# Patient Record
Sex: MALE | Race: ASIAN
[De-identification: names, ages, dates, MRNs, and addresses within clinical notes are randomized per-mention and may not be internally consistent; named-entity substitution may affect disease eponyms.]

---

## 2017-12-04 ENCOUNTER — HOSPITAL ENCOUNTER (OUTPATIENT)
Dept: HOSPITAL 53 - M ED | Age: 27
LOS: 2 days | Discharge: HOME | End: 2017-12-06
Attending: SURGERY
Payer: COMMERCIAL

## 2017-12-04 VITALS — DIASTOLIC BLOOD PRESSURE: 89 MMHG | SYSTOLIC BLOOD PRESSURE: 136 MMHG

## 2017-12-04 VITALS — HEIGHT: 70 IN | BODY MASS INDEX: 33.71 KG/M2 | WEIGHT: 235.5 LBS

## 2017-12-04 VITALS — DIASTOLIC BLOOD PRESSURE: 87 MMHG | SYSTOLIC BLOOD PRESSURE: 131 MMHG

## 2017-12-04 VITALS — DIASTOLIC BLOOD PRESSURE: 89 MMHG | SYSTOLIC BLOOD PRESSURE: 137 MMHG

## 2017-12-04 VITALS — DIASTOLIC BLOOD PRESSURE: 78 MMHG | SYSTOLIC BLOOD PRESSURE: 134 MMHG

## 2017-12-04 DIAGNOSIS — K35.80: Primary | ICD-10-CM

## 2017-12-04 DIAGNOSIS — I10: ICD-10-CM

## 2017-12-04 DIAGNOSIS — R50.9: ICD-10-CM

## 2017-12-04 DIAGNOSIS — R00.0: ICD-10-CM

## 2017-12-04 DIAGNOSIS — R33.9: ICD-10-CM

## 2017-12-04 LAB
ALBUMIN SERPL BCG-MCNC: 4 GM/DL (ref 3.2–5.2)
ALBUMIN/GLOB SERPL: 0.89 {RATIO} (ref 1–1.93)
ALP SERPL-CCNC: 65 U/L (ref 45–117)
ALT SERPL W P-5'-P-CCNC: 51 U/L (ref 12–78)
AMYLASE SERPL-CCNC: 58 U/L (ref 25–115)
ANION GAP SERPL CALC-SCNC: 5 MEQ/L (ref 8–16)
AST SERPL-CCNC: 17 U/L (ref 7–37)
BASOPHILS # BLD AUTO: 0 10^3/UL (ref 0–0.2)
BASOPHILS NFR BLD AUTO: 0.2 % (ref 0–1)
BILIRUB CONJ SERPL-MCNC: 0.1 MG/DL (ref 0–0.2)
BILIRUB SERPL-MCNC: 0.5 MG/DL (ref 0.2–1)
BUN SERPL-MCNC: 9 MG/DL (ref 7–18)
CALCIUM SERPL-MCNC: 9.5 MG/DL (ref 8.5–10.1)
CHLORIDE SERPL-SCNC: 102 MEQ/L (ref 98–107)
CO2 SERPL-SCNC: 31 MEQ/L (ref 21–32)
CREAT SERPL-MCNC: 0.83 MG/DL (ref 0.7–1.3)
EOSINOPHIL # BLD AUTO: 0.1 10^3/UL (ref 0–0.5)
EOSINOPHIL NFR BLD AUTO: 0.6 % (ref 0–3)
ERYTHROCYTE [DISTWIDTH] IN BLOOD BY AUTOMATED COUNT: 13.7 % (ref 11.5–14.5)
GFR SERPL CREATININE-BSD FRML MDRD: > 60 ML/MIN/{1.73_M2} (ref 60–?)
GLUCOSE SERPL-MCNC: 93 MG/DL (ref 70–105)
IMM GRANULOCYTES NFR BLD: 0.3 % (ref 0–0)
LYMPHOCYTES # BLD AUTO: 2.5 10^3/UL (ref 1.5–6.5)
LYMPHOCYTES NFR BLD AUTO: 25.7 % (ref 24–44)
MCH RBC QN AUTO: 28 PG (ref 27–33)
MCHC RBC AUTO-ENTMCNC: 33 G/DL (ref 32–36.5)
MCV RBC AUTO: 85 FL (ref 80–96)
MONOCYTES # BLD AUTO: 0.7 10^3/UL (ref 0–0.8)
MONOCYTES NFR BLD AUTO: 7.3 % (ref 0–5)
NEUTROPHILS # BLD AUTO: 6.3 10^3/UL (ref 1.8–7.7)
NEUTROPHILS NFR BLD AUTO: 65.9 % (ref 36–66)
NRBC BLD AUTO-RTO: 0 % (ref 0–0)
PLATELET # BLD AUTO: 241 10^3/UL (ref 150–450)
POTASSIUM SERPL-SCNC: 4.1 MEQ/L (ref 3.5–5.1)
PROT SERPL-MCNC: 8.5 GM/DL (ref 6.4–8.2)
SODIUM SERPL-SCNC: 138 MEQ/L (ref 136–145)
WBC # BLD AUTO: 9.6 10^3/UL (ref 4–10)

## 2017-12-04 PROCEDURE — 80076 HEPATIC FUNCTION PANEL: CPT

## 2017-12-04 PROCEDURE — 96374 THER/PROPH/DIAG INJ IV PUSH: CPT

## 2017-12-04 PROCEDURE — 85025 COMPLETE CBC W/AUTO DIFF WBC: CPT

## 2017-12-04 PROCEDURE — 85027 COMPLETE CBC AUTOMATED: CPT

## 2017-12-04 PROCEDURE — 85014 HEMATOCRIT: CPT

## 2017-12-04 PROCEDURE — 85018 HEMOGLOBIN: CPT

## 2017-12-04 PROCEDURE — 80048 BASIC METABOLIC PNL TOTAL CA: CPT

## 2017-12-04 PROCEDURE — 82150 ASSAY OF AMYLASE: CPT

## 2017-12-04 PROCEDURE — 81001 URINALYSIS AUTO W/SCOPE: CPT

## 2017-12-04 PROCEDURE — 36415 COLL VENOUS BLD VENIPUNCTURE: CPT

## 2017-12-04 PROCEDURE — 99284 EMERGENCY DEPT VISIT MOD MDM: CPT

## 2017-12-04 PROCEDURE — 88304 TISSUE EXAM BY PATHOLOGIST: CPT

## 2017-12-04 PROCEDURE — 74177 CT ABD & PELVIS W/CONTRAST: CPT

## 2017-12-04 PROCEDURE — 96376 TX/PRO/DX INJ SAME DRUG ADON: CPT

## 2017-12-04 PROCEDURE — 96375 TX/PRO/DX INJ NEW DRUG ADDON: CPT

## 2017-12-04 PROCEDURE — 44970 LAPAROSCOPY APPENDECTOMY: CPT

## 2017-12-04 PROCEDURE — 83690 ASSAY OF LIPASE: CPT

## 2017-12-04 RX ADMIN — SODIUM CHLORIDE, POTASSIUM CHLORIDE, SODIUM LACTATE AND CALCIUM CHLORIDE SCH MLS/HR: 600; 310; 30; 20 INJECTION, SOLUTION INTRAVENOUS at 20:17

## 2017-12-04 RX ADMIN — DOCUSATE SODIUM,SENNOSIDES SCH TAB: 50; 8.6 TABLET, FILM COATED ORAL at 21:00

## 2017-12-04 NOTE — REP
CT ABDOMEN PELVIS WITH IV CONTRAST ONLY:  12/04/2017.

 

Clinical history:  Right lower quadrant and left lower quadrant abdominal pain,

was initially periumbilical.

 

Technique.  The patient received a bolus of 100 mL of  Isovue 370 scanning

through the abdomen pelvis with coronal and sagittal reconstructions provided.

Bone windows also reviewed.

 

Findings: CT abdomen: Lung bases are clear.  Heart is not enlarged.  There is no

pericardial thickening or effusion and no aortic aneurysm.  No

hepatosplenomegaly, focal hepatic or splenic mass nor intrahepatic biliary

dilatation.  Gallbladder without calcified stone or mass.  Pancreas unremarkable.

Adrenal glands normal.  Kidneys without stone, mass or cyst.  Extrarenal pelves

are noted bilaterally, left greater than right.  Ureters are followed to the

bladder with no renal or ureteral stone identified.  The aorta is intact.  No

periaortic or retroperitoneal pathologic sized lymphadenopathy.  Lung window

review of all CT slices in the abdomen shows no perforation or free air.  Small

bowel loops unremarkable.  The right abdomen just at about at the level at the

iliac crest, the appendix extending to the posterior cecum with extensive

periappendiceal inflammatory changes.  Stranding in the fat and along the lateral

coronal fascia on the right side.  The adjacent terminal ileum shows some

inflammatory changes in the side abutting the appendix.  Lung window review shows

no evidence of perforation, free air or abscess at this time.  There are a few

tiny nodes in the pericecal region.  Remainder of the abdominal portion of the

colon was unremarkable.  Bone windows show lumbar and lower thoracic spine

without compression deformity.  There is unilateral L5 spondylolysis on the left

without spondylolisthesis.  Visualized thoracic levels and the ribs were

unremarkable.

 

CT pelvis: The sacrum, SI joints, pelvis, hips, acetabuli, pubic rami and

symphysis pubis were all unremarkable.  There are some bone islands in the hips

bilaterally as benign findings.  Bladder unremarkable.  No dilated distal ureters

or stones.  No bladder calculi mass or wall thickening.  No ventral or inguinal

hernia nor pathologic sized inguinal adenopathy.  No pelvic adenopathy or free

fluid.  Distal left colon, sigmoid and rectum intact.

 

Impression:

 

1.  Evidence for dilated appendix with extensive inflammatory changes adjacent

but no perforation or abscess at this time.  No air bubbles adjacent to it.  No

appendicolith seen.  The terminal ileum adjacent to the appendix shows

inflammatory changes but above and below that site.  There is no inflammatory

change.  Findings are consistent with acute appendicitis without perforation or

abscess.

 

2.  I do not see any other significant CT findings abdomen or pelvis.

 

 

Signed by

Low Mckeon MD 12/05/2017 08:15 P

## 2017-12-04 NOTE — HPE
DATE OF ADMISSION:  12/04/2017

 

CHIEF COMPLAINT:  Right lower quadrant abdominal pain.

 

HISTORY OF PRESENT ILLNESS: Patient is a 27-year-old male who presents right

lower quadrant abdominal pain since last Thursday.  It got better and worse off

and over the past 4 days.  Today, the pain was starting to get worse again so he

came into emergency room for evaluation.  In the ER he had normal vitals normal

labs however, CT scan showed signs of a dilated thickened appendix with concern

for acute appendicitis.  He denies any fevers or chills.  No nausea or vomiting.

No changes in bowel or bladder movements.  No recent illnesses or travel in the

last month.  No recent trauma to the area.

 

PAST MEDICAL HISTORY:  Negative.

 

PAST SURGICAL HISTORY: Right mastoidectomy.

 

ALLERGIES: None.

 

HOME MEDICATIONS: None.

 

SOCIAL HISTORY: Denies drug, alcohol, or tobacco.

 

FAMILY HISTORY: Noncontributory.

 

REVIEW OF SYSTEMS: Pertinent positives as stated in the HPI.

 

PHYSICAL EXAMINATION: GENERAL: In  no acute distress.  Vitals:  Stable, afebrile.

HEENT:  Pupils equal round react to light accommodation.

HEART:  S1-S2 regular rate and rhythm.

LUNGS:  Clear auscultation bilaterally.

ABDOMEN:  Soft, tender to palpation right lower quadrant.  No rebounding or

guarding. Bowel sounds positive.

EXTREMITIES: No clubbing, cyanosis or edema.

 

LABS: White count 9.6, hemoglobin 15, platelets 241.

 

IMAGING: CT abdomen and pelvis shows evidence of dilated appendix with extensive

inflammatory changes adjacent to it, but no signs of perforation or abscess.  No

air bubbles.

 

ASSESSMENT/PLAN: The patient is 27-year-old male with signs and symptoms

consistent of acute appendicitis.

 

RECOMMENDATIONS: Proceed with laparoscopic possible open appendectomy.  Risks and

benefits of the procedure not limited but including bleeding, infection, hernia

formation, damage surrounding structure, need for further surgery discussed in

detail with the patient. Informed consent was obtained and the procedure was

planned.

Postoperatively he will be admitted to the floor overnight.  As long as he is

doing well in the morning he will discharge home.

## 2017-12-05 VITALS — DIASTOLIC BLOOD PRESSURE: 70 MMHG | SYSTOLIC BLOOD PRESSURE: 139 MMHG

## 2017-12-05 VITALS — SYSTOLIC BLOOD PRESSURE: 146 MMHG | DIASTOLIC BLOOD PRESSURE: 76 MMHG

## 2017-12-05 VITALS — DIASTOLIC BLOOD PRESSURE: 82 MMHG | SYSTOLIC BLOOD PRESSURE: 140 MMHG

## 2017-12-05 VITALS — SYSTOLIC BLOOD PRESSURE: 122 MMHG | DIASTOLIC BLOOD PRESSURE: 71 MMHG

## 2017-12-05 VITALS — SYSTOLIC BLOOD PRESSURE: 145 MMHG | DIASTOLIC BLOOD PRESSURE: 88 MMHG

## 2017-12-05 VITALS — DIASTOLIC BLOOD PRESSURE: 88 MMHG | SYSTOLIC BLOOD PRESSURE: 144 MMHG

## 2017-12-05 VITALS — DIASTOLIC BLOOD PRESSURE: 73 MMHG | SYSTOLIC BLOOD PRESSURE: 134 MMHG

## 2017-12-05 LAB
ANION GAP SERPL CALC-SCNC: 8 MEQ/L (ref 8–16)
BUN SERPL-MCNC: 7 MG/DL (ref 7–18)
CALCIUM SERPL-MCNC: 8.3 MG/DL (ref 8.5–10.1)
CHLORIDE SERPL-SCNC: 102 MEQ/L (ref 98–107)
CO2 SERPL-SCNC: 29 MEQ/L (ref 21–32)
CREAT SERPL-MCNC: 0.92 MG/DL (ref 0.7–1.3)
ERYTHROCYTE [DISTWIDTH] IN BLOOD BY AUTOMATED COUNT: 13.9 % (ref 11.5–14.5)
GFR SERPL CREATININE-BSD FRML MDRD: > 60 ML/MIN/{1.73_M2} (ref 60–?)
GLUCOSE SERPL-MCNC: 131 MG/DL (ref 70–105)
MCH RBC QN AUTO: 29.1 PG (ref 27–33)
MCHC RBC AUTO-ENTMCNC: 34.3 G/DL (ref 32–36.5)
MCV RBC AUTO: 84.9 FL (ref 80–96)
NRBC BLD AUTO-RTO: 0 % (ref 0–0)
PLATELET # BLD AUTO: 220 10^3/UL (ref 150–450)
POTASSIUM SERPL-SCNC: 3.8 MEQ/L (ref 3.5–5.1)
SODIUM SERPL-SCNC: 139 MEQ/L (ref 136–145)
WBC # BLD AUTO: 10.7 10^3/UL (ref 4–10)

## 2017-12-05 RX ADMIN — KETOROLAC TROMETHAMINE PRN MG: 30 INJECTION, SOLUTION INTRAMUSCULAR at 09:27

## 2017-12-05 RX ADMIN — HYDROCODONE BITARTRATE AND ACETAMINOPHEN PRN TAB: 5; 325 TABLET ORAL at 17:47

## 2017-12-05 RX ADMIN — DOCUSATE SODIUM,SENNOSIDES SCH TAB: 50; 8.6 TABLET, FILM COATED ORAL at 21:50

## 2017-12-05 RX ADMIN — PIPERACILLIN SODIUM AND TAZOBACTAM SODIUM SCH MLS/HR: 36; 4.5 INJECTION, POWDER, FOR SOLUTION INTRAVENOUS at 12:59

## 2017-12-05 RX ADMIN — PIPERACILLIN SODIUM AND TAZOBACTAM SODIUM SCH MLS/HR: 36; 4.5 INJECTION, POWDER, FOR SOLUTION INTRAVENOUS at 23:34

## 2017-12-05 RX ADMIN — PIPERACILLIN SODIUM AND TAZOBACTAM SODIUM SCH MLS/HR: 36; 4.5 INJECTION, POWDER, FOR SOLUTION INTRAVENOUS at 17:47

## 2017-12-05 RX ADMIN — PIPERACILLIN SODIUM AND TAZOBACTAM SODIUM SCH MLS/HR: 36; 4.5 INJECTION, POWDER, FOR SOLUTION INTRAVENOUS at 06:01

## 2017-12-05 RX ADMIN — HYDROCODONE BITARTRATE AND ACETAMINOPHEN PRN TAB: 5; 325 TABLET ORAL at 08:18

## 2017-12-05 RX ADMIN — HYDROCODONE BITARTRATE AND ACETAMINOPHEN PRN TAB: 5; 325 TABLET ORAL at 23:35

## 2017-12-05 RX ADMIN — PIPERACILLIN SODIUM AND TAZOBACTAM SODIUM SCH MLS/HR: 36; 4.5 INJECTION, POWDER, FOR SOLUTION INTRAVENOUS at 00:09

## 2017-12-05 RX ADMIN — HYDROCODONE BITARTRATE AND ACETAMINOPHEN PRN TAB: 5; 325 TABLET ORAL at 00:09

## 2017-12-05 RX ADMIN — DOCUSATE SODIUM,SENNOSIDES SCH TAB: 50; 8.6 TABLET, FILM COATED ORAL at 08:17

## 2017-12-05 RX ADMIN — SODIUM CHLORIDE, POTASSIUM CHLORIDE, SODIUM LACTATE AND CALCIUM CHLORIDE SCH MLS/HR: 600; 310; 30; 20 INJECTION, SOLUTION INTRAVENOUS at 01:29

## 2017-12-05 NOTE — RO
DATE OF PROCEDURE:  12/04/2017

 

PREOPERATIVE DIAGNOSIS:  Acute appendicitis.

 

POSTOPERATIVE DIAGNOSIS:  Acute appendicitis.

 

PROCEDURE:  Laparoscopic appendectomy.

 

SURGEON:  Dr. Hugh Gallo

 

ASSISTANT:  None.

 

ANESTHESIA:  General.

 

ESTIMATED BLOOD LOSS:  5.

 

COMPLICATIONS:  None.

 

INDICATIONS FOR PROCEDURE:  The patient is 27-year-old male who presents with

right lower quadrant abdominal pain and found have signs of significant

inflammation and swelling around the appendix on CAT scan.  Recommendation was to

proceed with laparoscopic possible open appendectomy.  Risks and benefits of

procedure not limited to, but including bleeding, infection, hernia formation,

damage to surrounding structures, need for further surgery were discussed in

detail with the patient, informed consent obtained and procedure was planned.

 

DESCRIPTION OF PROCEDURE:  The patient was brought back to operating room three

after sufficient sedation.  The abdomen was sterilely prepped and draped.  Next a

time-out was done to confirm proper patient and proper procedure.  Following

that, a 5 mm incision was made in the left lower quadrant, Veress needle was

inserted and the abdomen was insufflated to 15 mmHg.  Next, the Veress needle was

removed.  A 5 mm Optiview port was used to gain access to the abdomen.  Once the

abdomen was entered, an 8 mm port was placed supraumbilically and another 5 mm

port suprapubically in the midline.  The appendix was then identified in the

right lower quadrant laterally.  It was densely adhered to the terminal ileum

with a large swelling and phlegmon distally.  This was carefully mobilized away

from the terminal ileum.  Towards the base the appendix was all fairly normal

size.  The mesoappendix was continually taken down using the Enseal until the

base was reached and space was reached.  It was encircled twice with two PDS

Endoloops and then amputated using the Enseal.  The appendix was brought out with

a 5 mm EndoCatch bag through the 8 mm port site.  The abdomen was then examined

one last time to confirm hemostasis.  The abdomen was then desufflated.  The skin

incisions closed #4-0 Vicryl subcuticular sutures.  The abdomen was cleaned and

dried.  Steri-Strips, 4x4 and tape were applied, thus ending the procedure.

## 2017-12-06 VITALS — SYSTOLIC BLOOD PRESSURE: 141 MMHG | DIASTOLIC BLOOD PRESSURE: 84 MMHG

## 2017-12-06 VITALS — SYSTOLIC BLOOD PRESSURE: 133 MMHG | DIASTOLIC BLOOD PRESSURE: 83 MMHG

## 2017-12-06 VITALS — DIASTOLIC BLOOD PRESSURE: 90 MMHG | SYSTOLIC BLOOD PRESSURE: 144 MMHG

## 2017-12-06 LAB
ANION GAP SERPL CALC-SCNC: 7 MEQ/L (ref 8–16)
BUN SERPL-MCNC: 7 MG/DL (ref 7–18)
CALCIUM SERPL-MCNC: 8.8 MG/DL (ref 8.5–10.1)
CHLORIDE SERPL-SCNC: 102 MEQ/L (ref 98–107)
CO2 SERPL-SCNC: 30 MEQ/L (ref 21–32)
CREAT SERPL-MCNC: 0.86 MG/DL (ref 0.7–1.3)
ERYTHROCYTE [DISTWIDTH] IN BLOOD BY AUTOMATED COUNT: 13.9 % (ref 11.5–14.5)
GFR SERPL CREATININE-BSD FRML MDRD: > 60 ML/MIN/{1.73_M2} (ref 60–?)
GLUCOSE SERPL-MCNC: 94 MG/DL (ref 70–105)
MCH RBC QN AUTO: 28.4 PG (ref 27–33)
MCHC RBC AUTO-ENTMCNC: 33.1 G/DL (ref 32–36.5)
MCV RBC AUTO: 85.8 FL (ref 80–96)
NRBC BLD AUTO-RTO: 0 % (ref 0–0)
PLATELET # BLD AUTO: 212 10^3/UL (ref 150–450)
POTASSIUM SERPL-SCNC: 3.6 MEQ/L (ref 3.5–5.1)
SODIUM SERPL-SCNC: 139 MEQ/L (ref 136–145)
WBC # BLD AUTO: 9.6 10^3/UL (ref 4–10)

## 2017-12-06 RX ADMIN — KETOROLAC TROMETHAMINE PRN MG: 30 INJECTION, SOLUTION INTRAMUSCULAR at 04:28

## 2017-12-06 RX ADMIN — DOCUSATE SODIUM,SENNOSIDES SCH TAB: 50; 8.6 TABLET, FILM COATED ORAL at 09:32

## 2017-12-06 RX ADMIN — PIPERACILLIN SODIUM AND TAZOBACTAM SODIUM SCH MLS/HR: 36; 4.5 INJECTION, POWDER, FOR SOLUTION INTRAVENOUS at 06:11

## 2017-12-06 NOTE — DSES
DATE OF ADMISSION:  12/04/2017

DATE OF DISCHARGE:

 

ADMISSION DIAGNOSIS:  Acute appendicitis.

 

DISCHARGE DIAGNOSIS:  Acute appendicitis.

 

HOSPITAL COURSE:  The patient is a 27-year-old male who presented on 12/04/2017

with abdominal pain for about 4 days and found to have acute appendicitis on CAT

scan.  He was taken to the operating room in the afternoon of 12/04/2017 for a

laparoscopic appendectomy.  Postoperatively, he was doing well.  He had some

fevers overnight as well as some tachycardia and hypertension that was all

attributed to urinary retention.  Once he was able to urinate, all of his

symptoms improved.  Just to be safe, we watched him for another 24 hours.  He

continued to do well.  This morning, 12/06/2017, his pain is almost gone.  He has

been up ambulating in the halls, tolerating a diet and denies any fevers or

chills.  No nausea or vomiting.  The plan is to discharge home today.  He will

follow up me in the office in 2 weeks.  He is given a script for Norco pain med

upon discharge and also advised to take Motrin as needed for pain.  All of his

questions were answered.  He will call the office with any new questions.

## 2019-07-03 ENCOUNTER — HOSPITAL ENCOUNTER (OUTPATIENT)
Dept: HOSPITAL 53 - M LAB REF | Age: 29
End: 2019-07-03
Attending: PHYSICIAN ASSISTANT
Payer: COMMERCIAL

## 2019-07-03 DIAGNOSIS — J02.9: Primary | ICD-10-CM

## 2022-09-22 ENCOUNTER — HOSPITAL ENCOUNTER (OUTPATIENT)
Dept: HOSPITAL 53 - M WUC | Age: 32
End: 2022-09-22
Attending: FAMILY MEDICINE
Payer: COMMERCIAL

## 2022-09-22 DIAGNOSIS — R63.5: Primary | ICD-10-CM

## 2022-09-22 DIAGNOSIS — F98.0: ICD-10-CM

## 2022-09-22 LAB
ALBUMIN SERPL BCG-MCNC: 3.8 GM/DL (ref 3.2–5.2)
ALT SERPL W P-5'-P-CCNC: 125 U/L (ref 12–78)
APPEARANCE UR: CLEAR
BASOPHILS # BLD AUTO: 0 10^3/UL (ref 0–0.2)
BASOPHILS NFR BLD AUTO: 0.4 % (ref 0–1)
BILIRUB SERPL-MCNC: 0.5 MG/DL (ref 0.2–1)
BILIRUB UR QL STRIP: NEGATIVE
BUN SERPL-MCNC: 12 MG/DL (ref 7–18)
CALCIUM SERPL-MCNC: 9 MG/DL (ref 8.5–10.1)
CHLORIDE SERPL-SCNC: 105 MEQ/L (ref 98–107)
CHOLEST SERPL-MCNC: 219 MG/DL (ref ?–200)
CHOLEST/HDLC SERPL: 4.56 {RATIO} (ref ?–5)
CO2 SERPL-SCNC: 27 MEQ/L (ref 21–32)
COLOR UR: YELLOW
CREAT SERPL-MCNC: 0.89 MG/DL (ref 0.7–1.3)
EOSINOPHIL # BLD AUTO: 0.2 10^3/UL (ref 0–0.5)
EOSINOPHIL NFR BLD AUTO: 2.3 % (ref 0–3)
GFR SERPL CREATININE-BSD FRML MDRD: > 60 ML/MIN/{1.73_M2} (ref 60–?)
GLUCOSE SERPL-MCNC: 114 MG/DL (ref 70–100)
GLUCOSE UR STRIP-MCNC: NEGATIVE MG/DL
HCT VFR BLD AUTO: 46.3 % (ref 42–52)
HDLC SERPL-MCNC: 48 MG/DL (ref 40–?)
HGB BLD-MCNC: 15.2 G/DL (ref 13.5–17.5)
HGB UR QL STRIP: NEGATIVE
KETONES UR QL STRIP: NEGATIVE MG/DL
LDLC SERPL CALC-MCNC: 143 MG/DL (ref ?–100)
LEUKOCYTE ESTERASE UR QL STRIP: NEGATIVE
LYMPHOCYTES # BLD AUTO: 2.6 10^3/UL (ref 1.5–5)
LYMPHOCYTES NFR BLD AUTO: 36.7 % (ref 24–44)
MCH RBC QN AUTO: 28.3 PG (ref 27–33)
MCHC RBC AUTO-ENTMCNC: 32.8 G/DL (ref 32–36.5)
MCV RBC AUTO: 86.1 FL (ref 80–96)
MONOCYTES # BLD AUTO: 0.4 10^3/UL (ref 0–0.8)
MONOCYTES NFR BLD AUTO: 6.3 % (ref 2–8)
NEUTROPHILS # BLD AUTO: 3.8 10^3/UL (ref 1.5–8.5)
NEUTROPHILS NFR BLD AUTO: 53.9 % (ref 36–66)
NITRITE UR QL STRIP: NEGATIVE
NONHDLC SERPL-MCNC: 171 MG/DL
PH UR STRIP: 5 UNITS (ref 5–7)
PLATELET # BLD AUTO: 273 10^3/UL (ref 150–450)
POTASSIUM SERPL-SCNC: 4 MEQ/L (ref 3.5–5.1)
PROT SERPL-MCNC: 7.4 GM/DL (ref 6.4–8.2)
PROT UR STRIP-MCNC: NEGATIVE MG/DL
RBC # BLD AUTO: 5.38 10^6/UL (ref 4.3–6.1)
SODIUM SERPL-SCNC: 138 MEQ/L (ref 136–145)
SP GR UR STRIP: 1.02 (ref 1–1.03)
TRIGL SERPL-MCNC: 138 MG/DL (ref ?–150)
TSH SERPL DL<=0.005 MIU/L-ACNC: 0.42 UIU/ML (ref 0.36–3.74)
UROBILINOGEN UR QL STRIP: NORMAL MG/DL
WBC # BLD AUTO: 7 10^3/UL (ref 4–10)